# Patient Record
Sex: MALE | Race: BLACK OR AFRICAN AMERICAN | NOT HISPANIC OR LATINO
[De-identification: names, ages, dates, MRNs, and addresses within clinical notes are randomized per-mention and may not be internally consistent; named-entity substitution may affect disease eponyms.]

---

## 2021-10-18 PROBLEM — Z00.00 ENCOUNTER FOR PREVENTIVE HEALTH EXAMINATION: Status: ACTIVE | Noted: 2021-10-18

## 2021-11-18 PROBLEM — Z86.39 HISTORY OF OBESITY: Status: RESOLVED | Noted: 2021-11-18 | Resolved: 2021-11-18

## 2021-11-18 PROBLEM — E13.9 DIABETES MELLITUS OF OTHER TYPE WITHOUT COMPLICATION: Status: RESOLVED | Noted: 2021-11-18 | Resolved: 2021-11-18

## 2021-11-18 PROBLEM — Z86.79 HISTORY OF HYPERTENSION: Status: RESOLVED | Noted: 2021-11-18 | Resolved: 2021-11-18

## 2021-11-18 PROBLEM — E11.39 TYPE 2 DIABETES MELLITUS WITH OTHER OPHTHALMIC COMPLICATION: Status: RESOLVED | Noted: 2021-11-18 | Resolved: 2021-11-18

## 2021-11-18 PROBLEM — Z86.69 HISTORY OF SLEEP APNEA: Status: RESOLVED | Noted: 2021-11-18 | Resolved: 2021-11-18

## 2021-11-18 PROBLEM — Z78.9 NON-SMOKER: Status: RESOLVED | Noted: 2021-11-18 | Resolved: 2021-11-18

## 2021-11-18 RX ORDER — ATORVASTATIN CALCIUM 20 MG/1
20 TABLET, FILM COATED ORAL
Refills: 0 | Status: ACTIVE | COMMUNITY

## 2021-11-18 RX ORDER — GLIMEPIRIDE 1 MG/1
1 TABLET ORAL
Refills: 0 | Status: ACTIVE | COMMUNITY

## 2021-11-19 ENCOUNTER — APPOINTMENT (OUTPATIENT)
Dept: RADIATION ONCOLOGY | Facility: CLINIC | Age: 61
End: 2021-11-19
Payer: COMMERCIAL

## 2021-11-19 VITALS
TEMPERATURE: 98.6 F | HEART RATE: 94 BPM | SYSTOLIC BLOOD PRESSURE: 145 MMHG | HEIGHT: 69 IN | WEIGHT: 315 LBS | BODY MASS INDEX: 46.65 KG/M2 | DIASTOLIC BLOOD PRESSURE: 84 MMHG | OXYGEN SATURATION: 99 % | RESPIRATION RATE: 16 BRPM

## 2021-11-19 DIAGNOSIS — E11.39 TYPE 2 DIABETES MELLITUS WITH OTHER DIABETIC OPHTHALMIC COMPLICATION: ICD-10-CM

## 2021-11-19 DIAGNOSIS — Z86.39 PERSONAL HISTORY OF OTHER ENDOCRINE, NUTRITIONAL AND METABOLIC DISEASE: ICD-10-CM

## 2021-11-19 DIAGNOSIS — Z86.69 PERSONAL HISTORY OF OTHER DISEASES OF THE NERVOUS SYSTEM AND SENSE ORGANS: ICD-10-CM

## 2021-11-19 DIAGNOSIS — E13.9 OTHER SPECIFIED DIABETES MELLITUS W/OUT COMPLICATIONS: ICD-10-CM

## 2021-11-19 DIAGNOSIS — Z86.79 PERSONAL HISTORY OF OTHER DISEASES OF THE CIRCULATORY SYSTEM: ICD-10-CM

## 2021-11-19 DIAGNOSIS — Z80.42 FAMILY HISTORY OF MALIGNANT NEOPLASM OF PROSTATE: ICD-10-CM

## 2021-11-19 DIAGNOSIS — Z78.9 OTHER SPECIFIED HEALTH STATUS: ICD-10-CM

## 2021-11-19 PROCEDURE — 99204 OFFICE O/P NEW MOD 45 MIN: CPT | Mod: 25

## 2021-11-19 RX ORDER — LISINOPRIL 30 MG/1
TABLET ORAL
Refills: 0 | Status: ACTIVE | COMMUNITY

## 2021-11-19 RX ORDER — BENAZEPRIL HYDROCHLORIDE AND HYDROCHLOROTHIAZIDE 20; 12.5 MG/1; MG/1
20-12.5 TABLET, FILM COATED ORAL
Refills: 0 | Status: DISCONTINUED | COMMUNITY
End: 2021-11-19

## 2021-11-19 NOTE — REVIEW OF SYSTEMS
[Constipation: Grade 0] : Constipation: Grade 0 [Diarrhea: Grade 0] : Diarrhea: Grade 0 [Hematuria: Grade 0] : Hematuria: Grade 0 [Urinary Incontinence: Grade 0] : Urinary Incontinence: Grade 0  [Urinary Retention: Grade 0] : Urinary Retention: Grade 0 [Urinary Tract Pain: Grade 0] : Urinary Tract Pain: Grade 0 [Urinary Urgency: Grade 1 - Present] : Urinary Urgency: Grade 1 - Present [Urinary Frequency: Grade 0] : Urinary Frequency: Grade 0

## 2021-11-19 NOTE — DISEASE MANAGEMENT
[] : Patient had a bone scan [3] : T3 [0] : M0 [>20] : >20 ng/mL [Biopsy] : Patient had a biopsy on [7(4+3)] : Template Biopsy Glasgow Score: 7(4+3) [5] : 5 [Extracapsular Extension] : Extracapsular extension [MeasuredProstateVolume] : 63g [BoneScanResults] : no evidence of metastatic disease [FreeTextEntry7] :  [III] : III [Radiation Therapy] : Radiation Therapy [Androgen Ablation] : Androgen Ablation

## 2021-11-19 NOTE — VITALS
[Maximal Pain Intensity: 0/10] : 0/10 [Least Pain Intensity: 0/10] : 0/10 [90: Able to carry normal activity; minor signs or symptoms of disease.] : 90: Able to carry normal activity; minor signs or symptoms of disease.  [ECOG Performance Status: 1 - Restricted in physically strenuous activity but ambulatory and able to carry out work of a light or sedentary nature] : Performance Status: 1 - Restricted in physically strenuous activity but ambulatory and able to carry out work of a light or sedentary nature, e.g., light house work, office work [Date: ____________] : Patient's last distress assessment performed on [unfilled]. [3 - Distress Level] : Distress Level: 3

## 2021-11-19 NOTE — PHYSICAL EXAM
[Normal] : normal external genitalia without lesions and no testicular masses [Normal] : oriented to person, place and time, the affect was normal, the mood was normal and not anxious [FreeTextEntry1] : Prostate enlarged, palpable nodule on left side.  No blood on the examining finger [de-identified] : Prostate

## 2021-11-19 NOTE — HISTORY OF PRESENT ILLNESS
[FreeTextEntry1] : Mr Izaguirre presents today for initial consultation of high risk prostate cancer, Patricia 7 (4+3). \par \par Lab results: (09/01/2021) \par PSA, free 13.0 ng / mL  \par PSA, total 189.0 ng/mL \par Prostate specific ag 184.0 ng/mL \par \par Patient underwent MRI on 09/27/2021 at [facility] \par MR PELVIS w/ and w/o contrast / Prostate Protocol :\par Findings: \par The prostate gland measure 5.5 x 4.6 x 4.8 cm for an estimated volume of 63 cc. \par There are multiple areas of T2 signal hypointensity and restricted diffusion throughout the peripheral zone, extending from the base to the apex, suggesting high volume prostate cancer. There is greater involvement of the right side of the gland and possible involvement of the right transition zone. Representative lesion centered in the mid gland right posterior peripheral zone measuring 2.3cm and ADC image 1.5 and has a broad base against the capsule which may indicate extraprostatic extension. A lesion at the right base measures 1.1 cm on ADC image 9 and comes in close proximity to the base of the right seminal vesicle. Representative lesion in the left mid gland posterior peripheral zone measures 1.1 cm on ADC image 16. \par \par 9/29/2021 Patient underwent prostate biopsy by Dr Haris Hong, (6 specimen) showed adenocarcinoma of prostate, grade group 3 (Patricia score 4+3=7) involving between 45%-90% of tissue, 2 or 2 cores involved by tumor, high grade prostatic intraepithelial neoplasia, perineural invasion identified. \par \par 10/7/2021 Bone Scan NM revealed no scintigraphic evidence of osseous metastatic disease\par He states that overall he feels well. He denies any hematuria, nocturia, or pain. He does have slight urinary urgency. He states that the urinary stream has slowed down a bit. \par AUA Score - 12 EPIC CP- 7

## 2022-04-26 ENCOUNTER — NON-APPOINTMENT (OUTPATIENT)
Age: 62
End: 2022-04-26

## 2022-04-26 VITALS
DIASTOLIC BLOOD PRESSURE: 89 MMHG | OXYGEN SATURATION: 98 % | HEART RATE: 95 BPM | HEIGHT: 69 IN | TEMPERATURE: 98 F | BODY MASS INDEX: 46.65 KG/M2 | RESPIRATION RATE: 16 BRPM | SYSTOLIC BLOOD PRESSURE: 156 MMHG | WEIGHT: 315 LBS

## 2022-04-26 NOTE — REVIEW OF SYSTEMS
[Constipation: Grade 0] : Constipation: Grade 0 [Diarrhea: Grade 0] : Diarrhea: Grade 0 [Nausea: Grade 0] : Nausea: Grade 0 [Rectal Pain: Grade 0] : Rectal Pain: Grade 0 [Vomiting: Grade 0] : Vomiting: Grade 0 [Fatigue: Grade 0] : Fatigue: Grade 0 [Urinary Incontinence: Grade 0] : Urinary Incontinence: Grade 0  [Urinary Retention: Grade 0] : Urinary Retention: Grade 0 [Urinary Tract Pain: Grade 0] : Urinary Tract Pain: Grade 0 [Urinary Urgency: Grade 0] : Urinary Urgency: Grade 0 [Pruritus: Grade 0] : Pruritus: Grade 0 [Skin Hyperpigmentation: Grade 0] : Skin Hyperpigmentation: Grade 0 [Dermatitis Radiation: Grade 0] : Dermatitis Radiation: Grade 0

## 2022-04-27 NOTE — DISEASE MANAGEMENT
[Clinical] : TNM Stage: c [TTNM] : 3 [NTNM] : 0 [MTNM] : 0 [III] : III [de-identified] : Patient completed 4/28 fractions for a total of 1000cGy tot he prostate

## 2022-04-27 NOTE — HISTORY OF PRESENT ILLNESS
[FreeTextEntry1] : Patient presents for OTV, he has completed 4 of 28 fractions for a total of 1,000cGy to the prostate.  He denies any pain. nocturia occasionally every 2 hours, other nights he has no issues.  Bowels are WNL.

## 2022-05-03 ENCOUNTER — NON-APPOINTMENT (OUTPATIENT)
Age: 62
End: 2022-05-03

## 2022-05-03 VITALS
WEIGHT: 315 LBS | DIASTOLIC BLOOD PRESSURE: 88 MMHG | BODY MASS INDEX: 46.65 KG/M2 | OXYGEN SATURATION: 100 % | HEIGHT: 69 IN | HEART RATE: 90 BPM | SYSTOLIC BLOOD PRESSURE: 151 MMHG | TEMPERATURE: 98.7 F | RESPIRATION RATE: 16 BRPM

## 2022-05-03 NOTE — HISTORY OF PRESENT ILLNESS
[FreeTextEntry1] : Patient presents for OTV, he has completed 4 of 28 fractions for a total of 1,000cGy to the prostate.  He denies any pain. nocturia occasionally every 2 hours, other nights he has no issues.  Bowels are WNL.\par \par 5/3/2022\par Patient presents for OTV.  He has completed 9/28 fractions for a total of 2,250cGy to the prostate.  He denies any pain or burning on urination.  Nocturia x3, bowels are within normal limits.  He will be given and RX for Flomax to start today QD for the nocturia.

## 2022-05-03 NOTE — DISEASE MANAGEMENT
[Clinical] : TNM Stage: c [III] : III [TTNM] : 3 [NTNM] : 0 [MTNM] : 0 [de-identified] : Patient completed 9/28 fractions for a total of 2,250 cGy to the prostate

## 2022-05-10 ENCOUNTER — NON-APPOINTMENT (OUTPATIENT)
Age: 62
End: 2022-05-10

## 2022-05-10 VITALS
OXYGEN SATURATION: 100 % | WEIGHT: 314 LBS | HEIGHT: 69 IN | RESPIRATION RATE: 18 BRPM | DIASTOLIC BLOOD PRESSURE: 90 MMHG | BODY MASS INDEX: 46.51 KG/M2 | SYSTOLIC BLOOD PRESSURE: 146 MMHG | HEART RATE: 96 BPM | TEMPERATURE: 98 F

## 2022-05-10 NOTE — DISEASE MANAGEMENT
[Clinical] : TNM Stage: c [III] : III [TTNM] : 3 [NTNM] : 0 [MTNM] : 0 [de-identified] : Patient completed 14/28 fractions for a total of 3,500 cGy to the prostate

## 2022-05-10 NOTE — HISTORY OF PRESENT ILLNESS
[FreeTextEntry1] : Patient presents for OTV, he has completed 4 of 28 fractions for a total of 1,000cGy to the prostate.  He denies any pain. nocturia occasionally every 2 hours, other nights he has no issues.  Bowels are WNL.\par \par 5/3/2022\par Patient presents for OTV.  He has completed 9/28 fractions for a total of 2,250cGy to the prostate.  He denies any pain or burning on urination.  Nocturia x3, bowels are within normal limits.  He will be given and RX for Flomax to start today QD for the nocturia.     \par \par 5/9/2022\par \par Patient presents today for routine OTV.  He has completed 14/28 fractions for a total of 3,500 cGy to the prostate.\par He denies any pain, urinary symptoms have improved.  Patient is taking Flomax QD.  Nocturia x1

## 2022-05-17 ENCOUNTER — NON-APPOINTMENT (OUTPATIENT)
Age: 62
End: 2022-05-17

## 2022-05-17 VITALS
HEART RATE: 91 BPM | WEIGHT: 315 LBS | DIASTOLIC BLOOD PRESSURE: 87 MMHG | SYSTOLIC BLOOD PRESSURE: 138 MMHG | RESPIRATION RATE: 18 BRPM | HEIGHT: 69 IN | OXYGEN SATURATION: 100 % | BODY MASS INDEX: 46.65 KG/M2 | TEMPERATURE: 98 F

## 2022-05-17 NOTE — REVIEW OF SYSTEMS
[Constipation: Grade 1 - Occasional or intermittent symptoms; occasional use of stool softeners, laxatives, dietary modification, or enema] : Constipation: Grade 1 - Occasional or intermittent symptoms; occasional use of stool softeners, laxatives, dietary modification, or enema [Diarrhea: Grade 0] : Diarrhea: Grade 0 [Hematuria: Grade 0] : Hematuria: Grade 0 [Urinary Incontinence: Grade 0] : Urinary Incontinence: Grade 0  [Urinary Retention: Grade 1 - Urinary, suprapubic or intermittent catheter placement not indicated; able to void with some residual] : Urinary Retention: Grade 1 - Urinary, suprapubic or intermittent catheter placement not indicated; able to void with some residual [Urinary Tract Pain: Grade 0] : Urinary Tract Pain: Grade 0 [Urinary Urgency: Grade 0] : Urinary Urgency: Grade 0

## 2022-05-17 NOTE — VITALS
[Maximal Pain Intensity: 0/10] : 0/10 [Least Pain Intensity: 0/10] : 0/10 [NoTreatment Scheduled] : no treatment scheduled [90: Able to carry normal activity; minor signs or symptoms of disease.] : 90: Able to carry normal activity; minor signs or symptoms of disease.  [90: Minor restrictions in physically strenous activity.] : 90: Minor restrictions in physically strenuous activity. [ECOG Performance Status: 1 - Restricted in physically strenuous activity but ambulatory and able to carry out work of a light or sedentary nature] : Performance Status: 1 - Restricted in physically strenuous activity but ambulatory and able to carry out work of a light or sedentary nature, e.g., light house work, office work

## 2022-05-18 NOTE — HISTORY OF PRESENT ILLNESS
[FreeTextEntry1] : Patient presents for OTV, he has completed 4 of 28 fractions for a total of 1,000cGy to the prostate.  He denies any pain. nocturia occasionally every 2 hours, other nights he has no issues.  Bowels are WNL.\par \par 5/3/2022\par Patient presents for OTV.  He has completed 9/28 fractions for a total of 2,250cGy to the prostate.  He denies any pain or burning on urination.  Nocturia x3, bowels are within normal limits.  He will be given and RX for Flomax to start today QD for the nocturia.     \par \par 5/9/2022\par \par Patient presents today for routine OTV.  He has completed 14/28 fractions for a total of 3,500 cGy to the prostate.\par He denies any pain, urinary symptoms have improved.  Patient is taking Flomax QD.  Nocturia x1\par \par 5/17/2022\par Patient completed 18/28 fractions for a total of 4,500 cGy to the prostate. He c/o nocturia X1-2, but it improving with Flomax. He also has delayed treatment today due to his bowel was full. recommended taking extra fliud and lots of fiber for his constipation   \par \par

## 2022-05-18 NOTE — DISEASE MANAGEMENT
[Clinical] : TNM Stage: c [III] : III [TTNM] : 3 [NTNM] : 0 [MTNM] : 0 [de-identified] : Patient completed 18/28 fractions for a total of 4,500 cGy to the prostate

## 2022-05-24 ENCOUNTER — NON-APPOINTMENT (OUTPATIENT)
Age: 62
End: 2022-05-24

## 2022-05-24 VITALS
DIASTOLIC BLOOD PRESSURE: 89 MMHG | OXYGEN SATURATION: 100 % | HEART RATE: 89 BPM | TEMPERATURE: 97.5 F | WEIGHT: 310 LBS | HEIGHT: 69 IN | SYSTOLIC BLOOD PRESSURE: 156 MMHG | BODY MASS INDEX: 45.91 KG/M2

## 2022-05-24 NOTE — DISEASE MANAGEMENT
[Clinical] : TNM Stage: c [III] : III [TTNM] : 3 [NTNM] : 0 [MTNM] : 0 [de-identified] : Patient completed 24/28 fractions for a total of 6000 cGy to the prostate

## 2022-05-24 NOTE — REVIEW OF SYSTEMS
[Constipation: Grade 0] : Constipation: Grade 0 [Diarrhea: Grade 0] : Diarrhea: Grade 0 [Hematuria: Grade 0] : Hematuria: Grade 0 [Urinary Incontinence: Grade 0] : Urinary Incontinence: Grade 0  [Urinary Retention: Grade 0] : Urinary Retention: Grade 0 [Urinary Tract Pain: Grade 0] : Urinary Tract Pain: Grade 0 [Urinary Urgency: Grade 0] : Urinary Urgency: Grade 0

## 2022-05-31 ENCOUNTER — NON-APPOINTMENT (OUTPATIENT)
Age: 62
End: 2022-05-31

## 2022-05-31 VITALS
BODY MASS INDEX: 46.21 KG/M2 | RESPIRATION RATE: 17 BRPM | TEMPERATURE: 97.3 F | OXYGEN SATURATION: 97 % | HEART RATE: 91 BPM | WEIGHT: 312 LBS | DIASTOLIC BLOOD PRESSURE: 91 MMHG | HEIGHT: 69 IN | SYSTOLIC BLOOD PRESSURE: 151 MMHG

## 2022-05-31 NOTE — HISTORY OF PRESENT ILLNESS
[FreeTextEntry1] : Patient presents for OTV, he has completed 4 of 28 fractions for a total of 1,000cGy to the prostate.  He denies any pain. nocturia occasionally every 2 hours, other nights he has no issues.  Bowels are WNL.\par \par 5/3/2022\par Patient presents for OTV.  He has completed 9/28 fractions for a total of 2,250cGy to the prostate.  He denies any pain or burning on urination.  Nocturia x3, bowels are within normal limits.  He will be given and RX for Flomax to start today QD for the nocturia.     \par \par 5/9/2022\par \par Patient presents today for routine OTV.  He has completed 14/28 fractions for a total of 3,500 cGy to the prostate.\par He denies any pain, urinary symptoms have improved.  Patient is taking Flomax QD.  Nocturia x1\par \par 5/17/2022\par Patient completed 18/28 fractions for a total of 4,500 cGy to the prostate. He c/o nocturia X1-2, but it improving with Flomax. He also has delayed treatment today due to his bowel was full. recommended taking extra fliud and lots of fiber for his constipation   \par \par 5/24/2022\par Patient completed 24/28 fractions for a total of 6000 cGy to the prostate. Nocturia X2, on flomax. Aubrie hematuria, any pain upon urination. BM is regular once a day.  \par \par 5/31/2022\par Patient completed 28/28 fractions for a total of 7000 cGy to the prostate. c/o nocturia X2, on flomax. Denies hematuria, any pain upon urination. BM is regular once a day.  He is doing good and he stated "will follow up his left hip issues with his orthopedic doctor. \par \par \par

## 2022-05-31 NOTE — DISEASE MANAGEMENT
[Clinical] : TNM Stage: c [III] : III [TTNM] : 3 [NTNM] : 0 [MTNM] : 0 [de-identified] : Patient completed 28/28 fractions for a total of 7000 cGy to the prostate

## 2022-07-01 ENCOUNTER — APPOINTMENT (OUTPATIENT)
Dept: RADIATION ONCOLOGY | Facility: CLINIC | Age: 62
End: 2022-07-01

## 2022-07-01 VITALS
HEART RATE: 91 BPM | SYSTOLIC BLOOD PRESSURE: 138 MMHG | HEIGHT: 69 IN | OXYGEN SATURATION: 98 % | TEMPERATURE: 98 F | WEIGHT: 305 LBS | RESPIRATION RATE: 16 BRPM | DIASTOLIC BLOOD PRESSURE: 83 MMHG | BODY MASS INDEX: 45.18 KG/M2

## 2022-07-01 LAB
PSA FREE FLD-MCNC: 6 %
PSA FREE SERPL-MCNC: 0.47 NG/ML
PSA SERPL-MCNC: 8.52 NG/ML

## 2022-07-01 PROCEDURE — 99024 POSTOP FOLLOW-UP VISIT: CPT

## 2022-07-01 NOTE — HISTORY OF PRESENT ILLNESS
[FreeTextEntry1] : Mr Izaguirre presents today for initial consultation of high risk prostate cancer, Patricia 7 (4+3). \par \par Lab results: (09/01/2021) \par PSA, free 13.0 ng / mL  \par PSA, total 189.0 ng/mL \par Prostate specific ag 184.0 ng/mL \par \par Patient underwent MRI on 09/27/2021 at [facility] \par MR PELVIS w/ and w/o contrast / Prostate Protocol :\par Findings: \par The prostate gland measure 5.5 x 4.6 x 4.8 cm for an estimated volume of 63 cc. \par There are multiple areas of T2 signal hypointensity and restricted diffusion throughout the peripheral zone, extending from the base to the apex, suggesting high volume prostate cancer. There is greater involvement of the right side of the gland and possible involvement of the right transition zone. Representative lesion centered in the mid gland right posterior peripheral zone measuring 2.3cm and ADC image 1.5 and has a broad base against the capsule which may indicate extraprostatic extension. A lesion at the right base measures 1.1 cm on ADC image 9 and comes in close proximity to the base of the right seminal vesicle. Representative lesion in the left mid gland posterior peripheral zone measures 1.1 cm on ADC image 16. \par \par 9/29/2021 Patient underwent prostate biopsy by Dr Haris Hong, (6 specimen) showed adenocarcinoma of prostate, grade group 3 (Patricia score 4+3=7) involving between 45%-90% of tissue, 2 or 2 cores involved by tumor, high grade prostatic intraepithelial neoplasia, perineural invasion identified. \par \par 10/7/2021 Bone Scan NM revealed no scintigraphic evidence of osseous metastatic disease\par He states that overall he feels well. He denies any hematuria, nocturia, or pain. He does have slight urinary urgency. He states that the urinary stream has slowed down a bit. \par AUA Score - 12 EPIC CP- 7

## 2022-07-01 NOTE — REVIEW OF SYSTEMS
[Hematuria: Grade 0] : Hematuria: Grade 0 [Urinary Incontinence: Grade 0] : Urinary Incontinence: Grade 0  [Urinary Retention: Grade 0] : Urinary Retention: Grade 0 [Urinary Tract Pain: Grade 0] : Urinary Tract Pain: Grade 0 [Urinary Urgency: Grade 1 - Present] : Urinary Urgency: Grade 1 - Present [Urinary Frequency: Grade 0] : Urinary Frequency: Grade 0

## 2022-07-01 NOTE — PHYSICAL EXAM
[Normal] : oriented to person, place and time, the affect was normal, the mood was normal and not anxious [FreeTextEntry1] : Prostate normal in size, firm, nontender.  No blood on the examining finger

## 2022-07-28 ENCOUNTER — RX RENEWAL (OUTPATIENT)
Age: 62
End: 2022-07-28

## 2022-10-26 ENCOUNTER — RX RENEWAL (OUTPATIENT)
Age: 62
End: 2022-10-26

## 2022-12-16 NOTE — VITALS
[Maximal Pain Intensity: 0/10] : 0/10 [90: Able to carry normal activity; minor signs or symptoms of disease.] : 90: Able to carry normal activity; minor signs or symptoms of disease.  [Fever] : no fever [Recent Weight Loss (___ Lbs)] : no recent weight loss [Eye Pain] : no eye pain [Red Eyes] : eyes not red [Sore Throat] : no sore throat [Cough] : no cough [SOB on Exertion] : no shortness of breath during exertion [Vomiting] : no vomiting [Constipation] : no constipation [Diarrhea] : no diarrhea [Heartburn] : no heartburn [Melena (black stool)] : no melena [Bleeding] : no bleeding [Fecal Incontinence (soiling)] : no fecal incontinence [Rash] : no rash [Joint Stiffness] : no joint stiffness

## 2023-01-18 ENCOUNTER — APPOINTMENT (OUTPATIENT)
Dept: RADIATION ONCOLOGY | Facility: CLINIC | Age: 63
End: 2023-01-18
Payer: COMMERCIAL

## 2023-01-18 VITALS
HEART RATE: 104 BPM | RESPIRATION RATE: 16 BRPM | OXYGEN SATURATION: 98 % | TEMPERATURE: 98.4 F | DIASTOLIC BLOOD PRESSURE: 66 MMHG | SYSTOLIC BLOOD PRESSURE: 125 MMHG

## 2023-01-18 PROCEDURE — 99213 OFFICE O/P EST LOW 20 MIN: CPT

## 2023-01-18 NOTE — REVIEW OF SYSTEMS
[Nocturia] : nocturia [Constipation: Grade 0] : Constipation: Grade 0 [Diarrhea: Grade 0] : Diarrhea: Grade 0 [Rectal Pain: Grade 0] : Rectal Pain: Grade 0 [Hematuria: Grade 0] : Hematuria: Grade 0 [Urinary Urgency: Grade 0] : Urinary Urgency: Grade 0 [Urinary Frequency: Grade 0] : Urinary Frequency: Grade 0 [Fever] : no fever [Chills] : no chills [Fatigue] : no fatigue [Recent Change In Weight] : ~T no recent weight change [Abdominal Pain] : no abdominal pain [Constipation] : no constipation [Diarrhea] : no diarrhea [Urinary Ostomy] : no ~T urinary ostomy present [Urinary Frequency] : no urinary frequency

## 2023-01-18 NOTE — PHYSICAL EXAM
[General Appearance - Alert] : alert [General Appearance - In No Acute Distress] : in no acute distress [Obese] : obese [] : no respiratory distress [Exaggerated Use Of Accessory Muscles For Inspiration] : no accessory muscle use [Motor Tone] : muscle strength and tone were normal [No Focal Deficits] : no focal deficits [Normal] : no focal deficits

## 2023-01-18 NOTE — VITALS
[Maximal Pain Intensity: 0/10] : 0/10 [Least Pain Intensity: 0/10] : 0/10 [80: Normal activity with effort; some signs or symptoms of disease.] : 80: Normal activity with effort; some signs or symptoms of disease.  [80: Active, but tires more quickly] : 80: Active, but tires more quickly

## 2023-01-24 ENCOUNTER — RX RENEWAL (OUTPATIENT)
Age: 63
End: 2023-01-24

## 2023-04-18 ENCOUNTER — RX RENEWAL (OUTPATIENT)
Age: 63
End: 2023-04-18

## 2023-07-14 ENCOUNTER — APPOINTMENT (OUTPATIENT)
Dept: RADIATION ONCOLOGY | Facility: CLINIC | Age: 63
End: 2023-07-14
Payer: COMMERCIAL

## 2023-07-14 VITALS
HEIGHT: 69 IN | OXYGEN SATURATION: 100 % | RESPIRATION RATE: 16 BRPM | SYSTOLIC BLOOD PRESSURE: 130 MMHG | WEIGHT: 300 LBS | TEMPERATURE: 98 F | HEART RATE: 88 BPM | BODY MASS INDEX: 44.43 KG/M2 | DIASTOLIC BLOOD PRESSURE: 80 MMHG

## 2023-07-14 DIAGNOSIS — C61 MALIGNANT NEOPLASM OF PROSTATE: ICD-10-CM

## 2023-07-14 PROCEDURE — 99212 OFFICE O/P EST SF 10 MIN: CPT

## 2023-07-14 RX ORDER — AMOXICILLIN AND CLAVULANATE POTASSIUM 875; 125 MG/1; MG/1
875-125 TABLET, COATED ORAL
Qty: 10 | Refills: 0 | Status: DISCONTINUED | COMMUNITY
Start: 2022-03-03 | End: 2023-07-14

## 2023-07-14 NOTE — DISEASE MANAGEMENT
[Clinical] : TNM Stage: c [N/A] : Currently not applicable [TTNM] : 3 [NTNM] : 0 [MTNM] : 0 [de-identified] : completed 28/28 fractions to a dose of 7000 cGy on 5/31/2022

## 2023-07-14 NOTE — REVIEW OF SYSTEMS
[Constipation: Grade 0] : Constipation: Grade 0 [Diarrhea: Grade 0] : Diarrhea: Grade 0 [Urinary Retention: Grade 0] : Urinary Retention: Grade 0 [Urinary Tract Pain: Grade 0] : Urinary Tract Pain: Grade 0 [Urinary Urgency: Grade 0] : Urinary Urgency: Grade 0 [Urinary Frequency: Grade 0] : Urinary Frequency: Grade 0

## 2023-10-24 ENCOUNTER — RX RENEWAL (OUTPATIENT)
Age: 63
End: 2023-10-24

## 2023-10-24 RX ORDER — TAMSULOSIN HYDROCHLORIDE 0.4 MG/1
0.4 CAPSULE ORAL
Qty: 30 | Refills: 2 | Status: ACTIVE | COMMUNITY
Start: 2022-05-03 | End: 1900-01-01

## 2024-07-17 ENCOUNTER — APPOINTMENT (OUTPATIENT)
Dept: RADIATION ONCOLOGY | Facility: CLINIC | Age: 64
End: 2024-07-17

## 2024-07-17 ENCOUNTER — APPOINTMENT (OUTPATIENT)
Dept: RADIATION ONCOLOGY | Facility: CLINIC | Age: 64
End: 2024-07-17
Payer: COMMERCIAL

## 2024-07-17 VITALS
OXYGEN SATURATION: 100 % | BODY MASS INDEX: 41.2 KG/M2 | SYSTOLIC BLOOD PRESSURE: 125 MMHG | DIASTOLIC BLOOD PRESSURE: 74 MMHG | RESPIRATION RATE: 16 BRPM | WEIGHT: 279 LBS | HEART RATE: 77 BPM

## 2024-07-17 DIAGNOSIS — C61 MALIGNANT NEOPLASM OF PROSTATE: ICD-10-CM

## 2024-07-17 PROCEDURE — 99212 OFFICE O/P EST SF 10 MIN: CPT

## 2024-08-20 ENCOUNTER — TRANSCRIPTION ENCOUNTER (OUTPATIENT)
Age: 64
End: 2024-08-20

## 2025-06-28 ENCOUNTER — LABORATORY RESULT (OUTPATIENT)
Age: 65
End: 2025-06-28